# Patient Record
Sex: FEMALE | Race: WHITE | NOT HISPANIC OR LATINO | URBAN - METROPOLITAN AREA
[De-identification: names, ages, dates, MRNs, and addresses within clinical notes are randomized per-mention and may not be internally consistent; named-entity substitution may affect disease eponyms.]

---

## 2017-07-28 ENCOUNTER — FOLLOW UP ESTABLISHED (OUTPATIENT)
Dept: URBAN - METROPOLITAN AREA CLINIC 24 | Facility: CLINIC | Age: 72
End: 2017-07-28
Payer: MEDICARE

## 2017-07-28 DIAGNOSIS — H50.111 MONOCULAR EXOTROPIA, RIGHT EYE: ICD-10-CM

## 2017-07-28 DIAGNOSIS — H53.031 STRABISMIC AMBLYOPIA, RIGHT EYE: Primary | ICD-10-CM

## 2017-07-28 DIAGNOSIS — H04.123 TEAR FILM INSUFFICIENCY OF BILATERAL LACRIMAL GLANDS: ICD-10-CM

## 2017-07-28 PROCEDURE — 92134 CPTRZ OPH DX IMG PST SGM RTA: CPT | Performed by: OPTOMETRIST

## 2017-07-28 PROCEDURE — 92014 COMPRE OPH EXAM EST PT 1/>: CPT | Performed by: OPTOMETRIST

## 2017-07-28 ASSESSMENT — KERATOMETRY
OD: 42.27
OS: 42.59

## 2017-07-28 ASSESSMENT — VISUAL ACUITY
OD: 20/125
OS: 20/20

## 2017-07-28 ASSESSMENT — INTRAOCULAR PRESSURE
OS: 16
OD: 16

## 2019-04-30 ENCOUNTER — FOLLOW UP ESTABLISHED (OUTPATIENT)
Dept: URBAN - METROPOLITAN AREA CLINIC 30 | Facility: CLINIC | Age: 74
End: 2019-04-30
Payer: COMMERCIAL

## 2019-04-30 PROCEDURE — 92134 CPTRZ OPH DX IMG PST SGM RTA: CPT | Performed by: OPHTHALMOLOGY

## 2019-04-30 PROCEDURE — 92004 COMPRE OPH EXAM NEW PT 1/>: CPT | Performed by: OPHTHALMOLOGY

## 2019-04-30 PROCEDURE — 92014 COMPRE OPH EXAM EST PT 1/>: CPT | Performed by: OPHTHALMOLOGY

## 2019-04-30 ASSESSMENT — INTRAOCULAR PRESSURE
OD: 18
OS: 18

## 2019-04-30 ASSESSMENT — VISUAL ACUITY
OS: 20/30
OD: 20/125

## 2019-04-30 ASSESSMENT — KERATOMETRY
OS: 42.73
OD: 42.81

## 2019-05-14 ENCOUNTER — FOLLOW UP ESTABLISHED (OUTPATIENT)
Dept: URBAN - METROPOLITAN AREA CLINIC 24 | Facility: CLINIC | Age: 74
End: 2019-05-14
Payer: COMMERCIAL

## 2019-05-14 DIAGNOSIS — H25.813 COMBINED FORMS OF AGE-RELATED CATARACT, BILATERAL: Primary | ICD-10-CM

## 2019-05-14 PROCEDURE — 92014 COMPRE OPH EXAM EST PT 1/>: CPT | Performed by: OPHTHALMOLOGY

## 2019-05-14 PROCEDURE — 92134 CPTRZ OPH DX IMG PST SGM RTA: CPT | Performed by: OPHTHALMOLOGY

## 2019-05-14 ASSESSMENT — INTRAOCULAR PRESSURE
OD: 16
OS: 17

## 2019-06-04 ENCOUNTER — Encounter (OUTPATIENT)
Dept: URBAN - METROPOLITAN AREA CLINIC 30 | Facility: CLINIC | Age: 74
End: 2019-06-04
Payer: COMMERCIAL

## 2019-06-04 DIAGNOSIS — Z01.818 ENCOUNTER FOR OTHER PREPROCEDURAL EXAMINATION: Primary | ICD-10-CM

## 2019-06-04 DIAGNOSIS — H25.11 AGE-RELATED NUCLEAR CATARACT, RIGHT EYE: Primary | ICD-10-CM

## 2019-06-04 PROCEDURE — 92025 CPTRIZED CORNEAL TOPOGRAPHY: CPT | Performed by: OPHTHALMOLOGY

## 2019-06-04 PROCEDURE — 99213 OFFICE O/P EST LOW 20 MIN: CPT | Performed by: PHYSICIAN ASSISTANT

## 2019-06-04 RX ORDER — LISINOPRIL 10 MG/1
10 MG TABLET ORAL
Qty: 0 | Refills: 0 | Status: INACTIVE
Start: 2019-06-04 | End: 2019-10-15

## 2019-06-10 ENCOUNTER — FOLLOW UP ESTABLISHED (OUTPATIENT)
Dept: URBAN - METROPOLITAN AREA CLINIC 24 | Facility: CLINIC | Age: 74
End: 2019-06-10
Payer: COMMERCIAL

## 2019-06-10 DIAGNOSIS — H52.223 REGULAR ASTIGMATISM, BILATERAL: ICD-10-CM

## 2019-06-10 PROCEDURE — 99213 OFFICE O/P EST LOW 20 MIN: CPT | Performed by: OPHTHALMOLOGY

## 2019-06-10 ASSESSMENT — INTRAOCULAR PRESSURE
OD: 20
OS: 18

## 2019-06-17 ENCOUNTER — SURGERY (OUTPATIENT)
Dept: URBAN - METROPOLITAN AREA SURGERY 12 | Facility: SURGERY | Age: 74
End: 2019-06-17
Payer: COMMERCIAL

## 2019-06-17 PROCEDURE — 66984 XCAPSL CTRC RMVL W/O ECP: CPT | Performed by: OPHTHALMOLOGY

## 2019-06-18 ENCOUNTER — POST OP (OUTPATIENT)
Dept: URBAN - METROPOLITAN AREA CLINIC 30 | Facility: CLINIC | Age: 74
End: 2019-06-18

## 2019-06-18 PROCEDURE — 99024 POSTOP FOLLOW-UP VISIT: CPT | Performed by: OPTOMETRIST

## 2019-06-18 ASSESSMENT — INTRAOCULAR PRESSURE: OD: 17

## 2019-06-24 ENCOUNTER — POST OP (OUTPATIENT)
Dept: URBAN - METROPOLITAN AREA CLINIC 30 | Facility: CLINIC | Age: 74
End: 2019-06-24

## 2019-06-24 PROCEDURE — 99024 POSTOP FOLLOW-UP VISIT: CPT | Performed by: OPTOMETRIST

## 2019-06-24 ASSESSMENT — INTRAOCULAR PRESSURE
OS: 16
OD: 17

## 2019-06-24 ASSESSMENT — VISUAL ACUITY
OS: 20/40
OD: 20/150

## 2019-07-01 ENCOUNTER — SURGERY (OUTPATIENT)
Dept: URBAN - METROPOLITAN AREA SURGERY 12 | Facility: SURGERY | Age: 74
End: 2019-07-01
Payer: COMMERCIAL

## 2019-07-01 PROCEDURE — 66984 XCAPSL CTRC RMVL W/O ECP: CPT | Performed by: OPHTHALMOLOGY

## 2019-07-02 ENCOUNTER — POST OP (OUTPATIENT)
Dept: URBAN - METROPOLITAN AREA CLINIC 30 | Facility: CLINIC | Age: 74
End: 2019-07-02

## 2019-07-02 DIAGNOSIS — Z96.1 PRESENCE OF INTRAOCULAR LENS: Primary | ICD-10-CM

## 2019-07-02 PROCEDURE — 99024 POSTOP FOLLOW-UP VISIT: CPT | Performed by: OPTOMETRIST

## 2019-07-02 ASSESSMENT — INTRAOCULAR PRESSURE: OS: 20

## 2019-07-09 ENCOUNTER — POST OP (OUTPATIENT)
Dept: URBAN - METROPOLITAN AREA CLINIC 30 | Facility: CLINIC | Age: 74
End: 2019-07-09

## 2019-07-09 PROCEDURE — 99024 POSTOP FOLLOW-UP VISIT: CPT | Performed by: OPTOMETRIST

## 2019-07-09 ASSESSMENT — VISUAL ACUITY
OS: 20/25
OD: 20/100

## 2019-07-09 ASSESSMENT — INTRAOCULAR PRESSURE
OD: 17
OS: 16

## 2019-07-22 ENCOUNTER — POST OP (OUTPATIENT)
Dept: URBAN - METROPOLITAN AREA CLINIC 30 | Facility: CLINIC | Age: 74
End: 2019-07-22

## 2019-07-22 PROCEDURE — 92015 DETERMINE REFRACTIVE STATE: CPT | Performed by: OPTOMETRIST

## 2019-07-22 PROCEDURE — 99024 POSTOP FOLLOW-UP VISIT: CPT | Performed by: OPTOMETRIST

## 2019-07-22 ASSESSMENT — VISUAL ACUITY
OS: 20/30
OD: 20/100

## 2019-07-22 ASSESSMENT — INTRAOCULAR PRESSURE
OS: 16
OD: 16

## 2019-10-15 ENCOUNTER — FOLLOW UP ESTABLISHED (OUTPATIENT)
Dept: URBAN - METROPOLITAN AREA CLINIC 30 | Facility: CLINIC | Age: 74
End: 2019-10-15
Payer: COMMERCIAL

## 2019-10-15 DIAGNOSIS — H16.223 KERATOCONJUNCTIVITIS SICCA, BILATERAL: Primary | ICD-10-CM

## 2019-10-15 PROCEDURE — 92012 INTRM OPH EXAM EST PATIENT: CPT | Performed by: OPHTHALMOLOGY

## 2019-10-15 RX ORDER — POLYETHYLENE GLYCOL 400 AND PROPYLENE GLYCOL 4; 3 MG/ML; MG/ML
SOLUTION/ DROPS OPHTHALMIC
Qty: 0 | Refills: 0 | Status: ACTIVE
Start: 2019-10-15

## 2019-10-15 ASSESSMENT — INTRAOCULAR PRESSURE
OS: 17
OD: 16

## 2020-02-19 ENCOUNTER — FOLLOW UP ESTABLISHED (OUTPATIENT)
Dept: URBAN - METROPOLITAN AREA CLINIC 30 | Facility: CLINIC | Age: 75
End: 2020-02-19
Payer: COMMERCIAL

## 2020-02-19 PROCEDURE — 92014 COMPRE OPH EXAM EST PT 1/>: CPT | Performed by: OPHTHALMOLOGY

## 2020-02-19 PROCEDURE — 92133 CPTRZD OPH DX IMG PST SGM ON: CPT | Performed by: OPHTHALMOLOGY

## 2020-02-19 PROCEDURE — 92134 CPTRZ OPH DX IMG PST SGM RTA: CPT | Performed by: OPHTHALMOLOGY

## 2020-02-19 ASSESSMENT — VISUAL ACUITY: OS: 20/150

## 2020-02-19 ASSESSMENT — INTRAOCULAR PRESSURE
OD: 15
OS: 15

## 2021-09-18 ENCOUNTER — OFFICE VISIT (OUTPATIENT)
Dept: URBAN - METROPOLITAN AREA CLINIC 30 | Facility: CLINIC | Age: 76
End: 2021-09-18
Payer: MEDICARE

## 2021-09-18 DIAGNOSIS — H26.493 OTHER SECONDARY CATARACT, BILATERAL: ICD-10-CM

## 2021-09-18 DIAGNOSIS — H43.393 OTHER VITREOUS OPACITIES, BILATERAL: ICD-10-CM

## 2021-09-18 PROCEDURE — 92134 CPTRZ OPH DX IMG PST SGM RTA: CPT | Performed by: OPTOMETRIST

## 2021-09-18 PROCEDURE — 92014 COMPRE OPH EXAM EST PT 1/>: CPT | Performed by: OPTOMETRIST

## 2021-09-18 ASSESSMENT — VISUAL ACUITY
OD: 20/150
OS: 20/20

## 2021-09-18 ASSESSMENT — KERATOMETRY
OD: 42.97
OS: 42.58

## 2021-09-18 ASSESSMENT — INTRAOCULAR PRESSURE
OD: 18
OS: 13

## 2021-09-18 NOTE — IMPRESSION/PLAN
Impression: Other vitreous opacities, bilateral Plan: Stable. Pt educ on findings. Retinas are flat and attached OU. Reviewed s/sx of RD and to call asap if occurs. MAC OCT WNL OU.

## 2021-09-18 NOTE — IMPRESSION/PLAN
Impression: Keratoconjunctivitis sicca, bilateral Plan: Using Systane 1-2x/day. Feels the Systane is helpful. Comfortable. Avoid fans, stay hydrated.

## 2023-10-05 ENCOUNTER — OFFICE VISIT (OUTPATIENT)
Dept: URBAN - METROPOLITAN AREA CLINIC 30 | Facility: CLINIC | Age: 78
End: 2023-10-05
Payer: MEDICARE

## 2023-10-05 DIAGNOSIS — H16.223 KERATOCONJUNCTIVITIS SICCA, BILATERAL: ICD-10-CM

## 2023-10-05 DIAGNOSIS — H43.393 OTHER VITREOUS OPACITIES, BILATERAL: ICD-10-CM

## 2023-10-05 DIAGNOSIS — H53.031 STRABISMIC AMBLYOPIA, RIGHT EYE: ICD-10-CM

## 2023-10-05 DIAGNOSIS — H52.4 PRESBYOPIA: ICD-10-CM

## 2023-10-05 DIAGNOSIS — H26.493 OTHER SECONDARY CATARACT, BILATERAL: Primary | ICD-10-CM

## 2023-10-05 PROCEDURE — 92134 CPTRZ OPH DX IMG PST SGM RTA: CPT | Performed by: OPTOMETRIST

## 2023-10-05 PROCEDURE — 92014 COMPRE OPH EXAM EST PT 1/>: CPT | Performed by: OPTOMETRIST

## 2023-10-05 ASSESSMENT — INTRAOCULAR PRESSURE
OS: 14
OD: 14

## 2023-10-05 ASSESSMENT — VISUAL ACUITY
OD: 20/150
OS: 20/25

## 2023-10-05 ASSESSMENT — KERATOMETRY
OS: 42.57
OD: 42.62